# Patient Record
Sex: MALE | Employment: OTHER | URBAN - METROPOLITAN AREA
[De-identification: names, ages, dates, MRNs, and addresses within clinical notes are randomized per-mention and may not be internally consistent; named-entity substitution may affect disease eponyms.]

---

## 2018-12-11 ENCOUNTER — TELEPHONE (OUTPATIENT)
Dept: TRANSPLANT | Facility: CLINIC | Age: 68
End: 2018-12-11

## 2018-12-11 NOTE — TELEPHONE ENCOUNTER
Pt will send the copy of his insurance cards. He requested we speak to his wife at [phone].

## 2018-12-12 ENCOUNTER — TELEPHONE (OUTPATIENT)
Dept: TRANSPLANT | Facility: CLINIC | Age: 68
End: 2018-12-12

## 2018-12-12 NOTE — TELEPHONE ENCOUNTER
----- Message from Kalina Barillas sent at 12/12/2018  4:48 PM CST -----  Contact: Patient    Returned pt call, but there was no answer. LVM fo him stating that he can either have his CD mailed to us, or he can bring it with him.  ----- Message -----  From: Jacquelyn Stafford  Sent: 12/12/2018   3:05 PM  To: [facility] Liver Referral Pool    Needs Advice    Reason for call: Would like to know if they have to send the CD before the appt or bring it the day of the appt        Communication Preference: [phone]    Additional Information: N/A

## 2018-12-12 NOTE — TELEPHONE ENCOUNTER
----- Message from Sue Patterson sent at 12/12/2018 10:31 AM CST -----  Contact: Pt   Needs Advice    Reason for call: Pt is requesting to speak with Leonora regarding the information she requested from him        Communication Preference:  #[phone] or [phone] (Ranjana pts wife)    Additional Information:

## 2018-12-21 ENCOUNTER — TELEPHONE (OUTPATIENT)
Dept: TRANSPLANT | Facility: CLINIC | Age: 68
End: 2018-12-21

## 2018-12-21 NOTE — TELEPHONE ENCOUNTER
----- Message from Kalina Barillas sent at 12/21/2018  1:03 PM CST -----  Contact: Patient    Returned pt call and he wanted to know about his med rtecs. Told him that we have access to his med recs, but we still need his insur cards. He will re-send them to [email or web address] and I will call him when I get them  ----- Message -----  From: Jacquelyn Stafford  Sent: 12/21/2018  12:43 PM  To: [facility] Liver Referral Pool    Patient Returning Call from [facility]    Who Left Message for Patient: Kalina    Communication Preference: [phone]    Additional Information: N/A

## 2018-12-26 ENCOUNTER — TELEPHONE (OUTPATIENT)
Dept: TRANSPLANT | Facility: CLINIC | Age: 68
End: 2018-12-26

## 2018-12-26 NOTE — TELEPHONE ENCOUNTER
----- Message from Kalina Barillas sent at 12/26/2018 10:58 AM CST -----  Contact: Patient    Returned pt call and told him that I didn't get his insur cards. Sp to his wife and sent her an e-mail and she will send his insur cards.   ----- Message -----  From: Jacquelyn Stafford  Sent: 12/26/2018  10:33 AM  To: [facility] Liver Referral Pool    Needs Advice    Reason for call: Calling to verify if the fax w/ a copy of his insurance cards were received         Communication Preference: [phone]    Additional Information: N/A

## 2018-12-26 NOTE — TELEPHONE ENCOUNTER
----- Message from Kalina Barillas sent at 12/26/2018 11:02 AM CST -----    Have pt insur info. Will give to nurse.

## 2019-01-02 ENCOUNTER — TELEPHONE (OUTPATIENT)
Dept: TRANSPLANT | Facility: CLINIC | Age: 69
End: 2019-01-02

## 2019-01-02 NOTE — TELEPHONE ENCOUNTER
Referral received from patient as self referral.     Patient with Alcohol cirrhosis with HCC. .  MELD 15  Referred for liver transplant for  EVALUATION.    Referral completed and forwarded to [facility].          Insurance:  PRIMARY: Medicare  ID:-T  Contact #     SECONDARY: [facility]  ID:[id number]  Contact #

## 2019-01-02 NOTE — TELEPHONE ENCOUNTER
----- Message from Kalina Barillas sent at 1/2/2019 11:07 AM CST -----  Contact: patient    Returned pt call and he told me that he will be in will be in [place] for a few days the week of 1/20 and wanted to get his work up done at that time. Told him that we are still pending his financial clearance, so based on when we get his clearance back and what kind of appt that he will need will determine if we can get him anjum during that time frame. Told him when we get his clearance back I will call him to [facility].   ----- Message -----  From: Bernice Luo  Sent: 1/2/2019  10:44 AM  To: [facility] Liver Referral Pool    Needs Advice    Reason for call: patient wishes to speak with young concerning and appointment and few issues (he didn't state during call)        Communication Preference: [phone]      Additional Information: patient stated he called two hours ago

## 2019-01-08 ENCOUNTER — TELEPHONE (OUTPATIENT)
Dept: TRANSPLANT | Facility: CLINIC | Age: 69
End: 2019-01-08

## 2019-01-08 NOTE — TELEPHONE ENCOUNTER
----- Message from Jacquelyn Stafford sent at 1/8/2019 12:57 PM CST -----  Contact: Patient  Needs Advice    Reason for call: Calling to schedule appt    Pt would also like to know if all his records were received        Communication Preference: [phone]    Additional Information: N/A

## 2019-01-11 ENCOUNTER — TELEPHONE (OUTPATIENT)
Dept: TRANSPLANT | Facility: CLINIC | Age: 69
End: 2019-01-11

## 2019-01-11 NOTE — TELEPHONE ENCOUNTER
----- Message from Jacquelyn Stafford sent at 1/11/2019  9:21 AM CST -----  Contact: Patient  Needs Advice    Reason for call: Pt would like appts changed back to Wed 1/23        Communication Preference: [phone]    Additional Information: N/A

## 2019-01-11 NOTE — TELEPHONE ENCOUNTER
Informed pt that I spoke with Dr Baldwin as he will be a pt from [place] and Dr. Baldwin did receive a call from Mira Blanchard and Dr. Mango Chavez .  Dr Baldwin wants the pt to have a full evaluation.  Pt informed of need for his care giver to be present as he is stating his wife would not be able to be present for entire eval.

## 2019-01-11 NOTE — TELEPHONE ENCOUNTER
----- Message from Jacquelyn Stafford sent at 1/11/2019  8:12 AM CST -----  Contact: Patient  Needs Advice    Reason for call: Pt called to reschedule appt for Thu 1/24        Communication Preference: [phone]    Additional Information: N/A

## 2019-01-14 DIAGNOSIS — Z76.82 ORGAN TRANSPLANT CANDIDATE: ICD-10-CM

## 2019-01-14 DIAGNOSIS — K70.30 ALCOHOLIC CIRRHOSIS, UNSPECIFIED WHETHER ASCITES PRESENT: Primary | ICD-10-CM

## 2019-01-14 DIAGNOSIS — C22.0 HCC (HEPATOCELLULAR CARCINOMA): ICD-10-CM

## 2019-01-16 ENCOUNTER — TELEPHONE (OUTPATIENT)
Dept: TRANSPLANT | Facility: CLINIC | Age: 69
End: 2019-01-16

## 2019-01-16 NOTE — TELEPHONE ENCOUNTER
----- Message from Kalina Barillas sent at 1/16/2019 11:28 AM CST -----    Rec'navya call from pt and he told me that he tried to send an e-mail to [email or web address], but it came back as undeliverable. Sent appt info to e-mail address in pt chart and to his work e-mail.

## 2019-01-16 NOTE — TELEPHONE ENCOUNTER
----- Message from Kalina Barillas sent at 1/16/2019 10:46 AM CST -----    Called pt to go over appts anjum'ed for the week of 1/28-2/1, but asked that I e-mail him a copy. Pt will send me an e-mail to [email or web address] and I will send him his appt slips.

## 2019-01-17 ENCOUNTER — TELEPHONE (OUTPATIENT)
Dept: PREADMISSION TESTING | Facility: HOSPITAL | Age: 69
End: 2019-01-17

## 2019-01-17 DIAGNOSIS — Z76.82 ORGAN TRANSPLANT CANDIDATE: Primary | ICD-10-CM

## 2019-01-17 NOTE — TELEPHONE ENCOUNTER
----- Message from Kalina Barillas sent at 1/17/2019 12:48 PM CST -----  Pt needs an appt for the week of 1/31-21 for liver txp work up.

## 2019-01-25 ENCOUNTER — TELEPHONE (OUTPATIENT)
Dept: TRANSPLANT | Facility: CLINIC | Age: 69
End: 2019-01-25

## 2019-02-05 ENCOUNTER — DOCUMENTATION ONLY (OUTPATIENT)
Dept: TRANSPLANT | Facility: CLINIC | Age: 69
End: 2019-02-05

## 2019-02-06 ENCOUNTER — TELEPHONE (OUTPATIENT)
Dept: TRANSPLANT | Facility: CLINIC | Age: 69
End: 2019-02-06

## 2019-02-06 NOTE — TELEPHONE ENCOUNTER
"Pt called and informed I discussed his case with Dr. Baldwin. Pt will be offered an appt with our team and the addiction psych provider and SW. I informed him per Dr. Cortes, these appts are not a listing appt.or a guarantee to listing. I advised pt that he needs to be proactive with his care and suggested IP rehab again.  Pt stated "please help me". I told him I did my part getting the appts cleared but it is up to him to maintain his sobriety.  I informed him we will need to contact his provider in [place] re routine screening for alcohol. I spoke of Dr Mira Blanchard who informed us of his referral and he stated his provider is DR Chavez. I believe Mira works with Dr Chavez. Dr Blanchard is know to us as she was a fellow with our team.   "

## 2019-02-06 NOTE — TELEPHONE ENCOUNTER
----- Message from Kalina Barillas sent at 2/6/2019  9:51 AM CST -----  Contact: patient   Reason for call: Reschedule Consult         Communication Preference: [phone]    ----- Message -----  From: Bernice Luo  Sent: 2/6/2019   9:32 AM  [facility]: [facility] Non[facility]Clinical    Needs Advice    Reason for call: Reschedule Consult         Communication Preference: [phone]    Additional Information: n/a

## 2019-02-06 NOTE — TELEPHONE ENCOUNTER
"Returned pt call. Pt interested in rescheduling his eval. I questioned pt about his potential re-listing at [facility]. He stated they found a new lesion and he is to return for treatment. He declined to admit he was not listed again due to alcohol + PETH test. I informed him I did have access to [facility] records and I was aware that they did not re list due to a + PETH. Pt stated "I slipped once" and " I am not a really hard drinker". He also stated "it is not like I gone back to drinking".  I informed the pt that any amount of alcohol is not acceptable, hard or no,t and that he did in fact return to drinking. I informed him that before I reschedule his eval I will need to discuss his case with Dr Baldwin. PT preceded to request I schedule his eval. I informed him multiple times that due to his recent positive peth despite being listed before is concerning but I would have to review with Dr Baldwin.  Pt then stated he was not ever listed. I corrected him and stated he was in fact listed at [facility] in  2017 with MELD exception points for his liver cancer and I am surprised he is telling me he wasn't ever listed.  I informed him by records he was de listed in 2/2018 due to alcohol use.  At which he stated it was due to his weight. I informed him the committee note I was reading clearly stated due to alcohol.  He also stated he will not drink after transplant to which I responded it is his drinking before transplant that would make him a high risk for recidivism after transplant and  that is a concern to us. I suggested he start AA or rehab at this time and that I will have to speak with Dr Baldwin. PT stated,  " I am in a predicament that I will die of liver cancer if I am not transplanted. 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 Again I informed him I will speak to Dr Baldwin as to what type of appt he will be willing to offer the patient.

## 2019-02-11 ENCOUNTER — TELEPHONE (OUTPATIENT)
Dept: TRANSPLANT | Facility: CLINIC | Age: 69
End: 2019-02-11

## 2019-02-11 DIAGNOSIS — Z76.82 AWAITING ORGAN TRANSPLANT STATUS: Primary | ICD-10-CM

## 2019-02-11 NOTE — TELEPHONE ENCOUNTER
----- Message from Kalina Barillas sent at 2/11/2019  3:59 PM CST -----  Contact: Patient    Returned call to pt and told him we are waiting for his add psych and we will anjum him to see the SW, Hepat MD and labs at the same time.   ----- Message -----  From: Lisa Lau  Sent: 2/11/2019  10:07 AM  To: [facility] Liver Referral Pool    Needs Advice    Reason for call: Patient wishes to speak to Leonora regarding his evaluation.         Communication Preference: [phone]      Additional Information: n/a

## 2019-02-13 ENCOUNTER — TELEPHONE (OUTPATIENT)
Dept: TRANSPLANT | Facility: CLINIC | Age: 69
End: 2019-02-13

## 2019-02-13 NOTE — TELEPHONE ENCOUNTER
----- Message from Kalina Barillas sent at 2/13/2019  9:16 AM CST -----  Regarding: FW: Liver txp pt    Noted.  ----- Message -----  From: Kalina Barillas  Sent: 2/13/2019   9:13 AM  To:   Subject: FW: Liver txp pt                                     ----- Message -----  From: Leann Ferreira MA  Sent: 2/13/2019   9:04 AM  To: Kalina Barillas  Subject: RE: Liver txp pt                                 Unable to sched appt from referral. May be a glitch, not sure. I sent a msg to Sarah for assistance. I will let u know when I sched. Just wanted to give you an update  ----- Message -----  From: Kalina Barillas  Sent: 2/11/2019  11:53 AM  To: Leann Ferreira MA  Subject: RE: Liver txp pt                                 There is one in  ----- Message -----  From: Leann Ferreira MA  Sent: 2/11/2019  11:12 AM  To: Kalina Barillas  Subject: RE: Liver txp pt                                 Need referral to sched. thanks  ----- Message -----  From: Kalina Barillas  Sent: 2/11/2019  10:57 AM  To: Leann Ferreira MA  Subject: Liver txp pt                                     Pt needs an appt for addiction.

## 2019-02-26 ENCOUNTER — TELEPHONE (OUTPATIENT)
Dept: TRANSPLANT | Facility: CLINIC | Age: 69
End: 2019-02-26

## 2019-02-26 NOTE — TELEPHONE ENCOUNTER
----- Message from Kalina Barillas sent at 2/26/2019  3:16 PM CST -----  Contact: Self- [phone]    Returned call to pt and told gave him an updated on his appt; waiting to see if we can  the psych appt later in to the day/week, so we can anjum him with SW.   ----- Message -----  From: Aliyah Lee  Sent: 2/26/2019  11:21 AM  To: Kalina Barillas    Pt returning missed call- please contact pt at [phone]

## 2019-03-01 ENCOUNTER — TELEPHONE (OUTPATIENT)
Dept: TRANSPLANT | Facility: CLINIC | Age: 69
End: 2019-03-01

## 2019-03-01 NOTE — TELEPHONE ENCOUNTER
----- Message from Kalina Barillas sent at 3/1/2019 10:23 AM CST -----    Called and sp to pt about appts [facility]'ed for 4/22. Will e-mail him a copy of his appts.

## 2019-03-07 DIAGNOSIS — K70.31 ALCOHOLIC CIRRHOSIS OF LIVER WITH ASCITES: ICD-10-CM

## 2019-03-07 DIAGNOSIS — K72.10 END STAGE LIVER DISEASE: ICD-10-CM

## 2019-03-07 DIAGNOSIS — Z76.82 AWAITING ORGAN TRANSPLANT STATUS: Primary | ICD-10-CM

## 2019-04-15 ENCOUNTER — TELEPHONE (OUTPATIENT)
Dept: TRANSPLANT | Facility: CLINIC | Age: 69
End: 2019-04-15

## 2019-04-15 NOTE — TELEPHONE ENCOUNTER
----- Message from Lisa Lau sent at 4/15/2019  3:15 PM CDT -----  Contact: Patient  Needs Advice    Reason for call: Patient would like [facility] reservations moved to rescheduled appt dates. (5/13)        Communication Preference: [phone]    Additional Information: Appointments have not been changed as of yet. Please refer to Pre-Liver Schedulers

## 2019-04-15 NOTE — TELEPHONE ENCOUNTER
SW returned call to patient this afternoon in regards to his message about moving his [facility] reservation.   BAUTISTA spoke with Rody in International Dept, she did not handle this for the patient.  BAUTISTA spoke with pre-nurse Nicki jean-baptiste, who reports pt is taking care of all of these appointments and hotel reservations.       BAUTISTA reached out to the patient who reports that he is needing to change his appts as his MIL is sick.  Pt confirms he made the original reservations for the [facility], BAUTISTA instructed pt that he will need to speak to the reservation desk about making any changes.  Pt not able to take down hotel contact information, so BAUTISTA transferred patient to the hotel .    Pt with no other psychosocial concerns at this time.  BAUTISTA remains available for all transplant resources, education and psychosocial support.

## 2019-04-17 ENCOUNTER — TELEPHONE (OUTPATIENT)
Dept: TRANSPLANT | Facility: CLINIC | Age: 69
End: 2019-04-17

## 2019-04-17 NOTE — TELEPHONE ENCOUNTER
----- Message from Kalina Barillas sent at 4/17/2019  1:40 PM CDT -----  Contact: Patient    Pt apppts have been re/anjum to 6/3    ----- Message -----  From: Lisa Lau  Sent: 4/15/2019   3:12 PM  To: [facility] Pre-Liver Transplant Non-Clinical    Needs Advice    Reason for call: Patient wishes to reschedule his 4/22 to 5/13, if available.          Communication Preference: [phone]    Additional Information: n/a

## 2019-05-01 ENCOUNTER — TELEPHONE (OUTPATIENT)
Dept: TRANSPLANT | Facility: CLINIC | Age: 69
End: 2019-05-01

## 2019-05-01 NOTE — TELEPHONE ENCOUNTER
----- Message from Kalina Barillas sent at 5/1/2019  3:47 PM CDT -----    Returned call to pt and he wanted to know if we have a BMI cut off and a sobriety cut off. Told that we do not have a BMI cut off and that we do not have a sobriety cut off, but his insur co might. He told me that his last drink was OCT 2018. Will let nurse know.     ----- Message -----  From: Leonora Betancur  Sent: 5/1/2019   3:18 PM  To: Nicki Cisneros, RN, Kalina Barillas      ----- Message -----  From: Swathi Damon  Sent: 5/1/2019   2:48 PM  To: [facility] Referral Pool    Calling to speak Eren regarding questions he has about appts on 6/3    Pt contact [phone]

## 2019-05-03 ENCOUNTER — TELEPHONE (OUTPATIENT)
Dept: TRANSPLANT | Facility: CLINIC | Age: 69
End: 2019-05-03

## 2019-05-03 NOTE — TELEPHONE ENCOUNTER
----- Message from Kalina Barillas sent at 5/3/2019  3:23 PM CDT -----  Contact: Rek[facility]    Returned call to Liver Txp center, but the nurse was no available. LM stating that the pt is not anjum'ed to see us until 6/2019    ----- Message -----  From: Jermaine Miranda  Sent: 5/3/2019   3:16 PM  To: [facility] Liver Referral Pool    Martha Lea    Please call Rekha at [phone]    Would like to know the status of the liver transplant    Thank you

## 2019-05-30 ENCOUNTER — TELEPHONE (OUTPATIENT)
Dept: TRANSPLANT | Facility: CLINIC | Age: 69
End: 2019-05-30

## 2019-05-30 NOTE — TELEPHONE ENCOUNTER
----- Message from Jackarlet Eran sent at 5/30/2019  9:46 AM CDT -----  Contact: patient     Returned call to pt and he told me that he needs to cx'ed his appt anjum'ed for 6/3 because his mother in law is sick, so his wife will not be able to ameena with him to this appt. Told him that I will have to with the nurse before we re/anjum him again; this is the 4th work up he has cx'ed.    ----- Message -----  From: Amy Wood  Sent: 5/30/2019   8:26 AM  To: [facility] Liver Referral Pool    Contact: Patient     Message: Patient states he would like to speak to Leonora but didn't state why    Communication Preference: [phone]    Additional Information:     Thanks!